# Patient Record
Sex: FEMALE | Race: OTHER | HISPANIC OR LATINO | URBAN - METROPOLITAN AREA
[De-identification: names, ages, dates, MRNs, and addresses within clinical notes are randomized per-mention and may not be internally consistent; named-entity substitution may affect disease eponyms.]

---

## 2021-09-18 ENCOUNTER — EMERGENCY (EMERGENCY)
Facility: HOSPITAL | Age: 81
LOS: 1 days | Discharge: HOME | End: 2021-09-18
Attending: EMERGENCY MEDICINE
Payer: MEDICAID

## 2021-09-18 VITALS
RESPIRATION RATE: 18 BRPM | OXYGEN SATURATION: 96 % | TEMPERATURE: 99 F | DIASTOLIC BLOOD PRESSURE: 83 MMHG | HEART RATE: 77 BPM | SYSTOLIC BLOOD PRESSURE: 185 MMHG

## 2021-09-18 VITALS
HEART RATE: 76 BPM | RESPIRATION RATE: 18 BRPM | SYSTOLIC BLOOD PRESSURE: 164 MMHG | OXYGEN SATURATION: 97 % | DIASTOLIC BLOOD PRESSURE: 67 MMHG | TEMPERATURE: 98 F

## 2021-09-18 LAB
ALBUMIN SERPL ELPH-MCNC: 3.5 G/DL — SIGNIFICANT CHANGE UP (ref 3.5–5)
ALP SERPL-CCNC: 114 U/L — SIGNIFICANT CHANGE UP (ref 40–120)
ALT FLD-CCNC: 20 U/L DA — SIGNIFICANT CHANGE UP (ref 10–60)
ANION GAP SERPL CALC-SCNC: 8 MMOL/L — SIGNIFICANT CHANGE UP (ref 5–17)
APPEARANCE UR: CLEAR — SIGNIFICANT CHANGE UP
AST SERPL-CCNC: 19 U/L — SIGNIFICANT CHANGE UP (ref 10–40)
BASOPHILS # BLD AUTO: 0.04 K/UL — SIGNIFICANT CHANGE UP (ref 0–0.2)
BASOPHILS NFR BLD AUTO: 0.4 % — SIGNIFICANT CHANGE UP (ref 0–2)
BILIRUB SERPL-MCNC: 0.5 MG/DL — SIGNIFICANT CHANGE UP (ref 0.2–1.2)
BILIRUB UR-MCNC: NEGATIVE — SIGNIFICANT CHANGE UP
BUN SERPL-MCNC: 18 MG/DL — SIGNIFICANT CHANGE UP (ref 7–18)
CALCIUM SERPL-MCNC: 8.9 MG/DL — SIGNIFICANT CHANGE UP (ref 8.4–10.5)
CHLORIDE SERPL-SCNC: 106 MMOL/L — SIGNIFICANT CHANGE UP (ref 96–108)
CK SERPL-CCNC: 66 U/L — SIGNIFICANT CHANGE UP (ref 21–215)
CO2 SERPL-SCNC: 27 MMOL/L — SIGNIFICANT CHANGE UP (ref 22–31)
COLOR SPEC: YELLOW — SIGNIFICANT CHANGE UP
CREAT SERPL-MCNC: 0.48 MG/DL — LOW (ref 0.5–1.3)
DIFF PNL FLD: ABNORMAL
EOSINOPHIL # BLD AUTO: 0.1 K/UL — SIGNIFICANT CHANGE UP (ref 0–0.5)
EOSINOPHIL NFR BLD AUTO: 1.1 % — SIGNIFICANT CHANGE UP (ref 0–6)
GLUCOSE SERPL-MCNC: 120 MG/DL — HIGH (ref 70–99)
GLUCOSE UR QL: NEGATIVE — SIGNIFICANT CHANGE UP
HCT VFR BLD CALC: 35.7 % — SIGNIFICANT CHANGE UP (ref 34.5–45)
HGB BLD-MCNC: 11.5 G/DL — SIGNIFICANT CHANGE UP (ref 11.5–15.5)
IMM GRANULOCYTES NFR BLD AUTO: 1.6 % — HIGH (ref 0–1.5)
KETONES UR-MCNC: NEGATIVE — SIGNIFICANT CHANGE UP
LEUKOCYTE ESTERASE UR-ACNC: NEGATIVE — SIGNIFICANT CHANGE UP
LYMPHOCYTES # BLD AUTO: 1.86 K/UL — SIGNIFICANT CHANGE UP (ref 1–3.3)
LYMPHOCYTES # BLD AUTO: 20 % — SIGNIFICANT CHANGE UP (ref 13–44)
MCHC RBC-ENTMCNC: 29.6 PG — SIGNIFICANT CHANGE UP (ref 27–34)
MCHC RBC-ENTMCNC: 32.2 GM/DL — SIGNIFICANT CHANGE UP (ref 32–36)
MCV RBC AUTO: 91.8 FL — SIGNIFICANT CHANGE UP (ref 80–100)
MONOCYTES # BLD AUTO: 0.45 K/UL — SIGNIFICANT CHANGE UP (ref 0–0.9)
MONOCYTES NFR BLD AUTO: 4.8 % — SIGNIFICANT CHANGE UP (ref 2–14)
NEUTROPHILS # BLD AUTO: 6.7 K/UL — SIGNIFICANT CHANGE UP (ref 1.8–7.4)
NEUTROPHILS NFR BLD AUTO: 72.1 % — SIGNIFICANT CHANGE UP (ref 43–77)
NITRITE UR-MCNC: NEGATIVE — SIGNIFICANT CHANGE UP
NRBC # BLD: 0 /100 WBCS — SIGNIFICANT CHANGE UP (ref 0–0)
NT-PROBNP SERPL-SCNC: 88 PG/ML — SIGNIFICANT CHANGE UP (ref 0–450)
PH UR: 7 — SIGNIFICANT CHANGE UP (ref 5–8)
PLATELET # BLD AUTO: 229 K/UL — SIGNIFICANT CHANGE UP (ref 150–400)
POTASSIUM SERPL-MCNC: 3.6 MMOL/L — SIGNIFICANT CHANGE UP (ref 3.5–5.3)
POTASSIUM SERPL-SCNC: 3.6 MMOL/L — SIGNIFICANT CHANGE UP (ref 3.5–5.3)
PROT SERPL-MCNC: 7.7 G/DL — SIGNIFICANT CHANGE UP (ref 6–8.3)
PROT UR-MCNC: NEGATIVE — SIGNIFICANT CHANGE UP
RBC # BLD: 3.89 M/UL — SIGNIFICANT CHANGE UP (ref 3.8–5.2)
RBC # FLD: 12.9 % — SIGNIFICANT CHANGE UP (ref 10.3–14.5)
SARS-COV-2 RNA SPEC QL NAA+PROBE: SIGNIFICANT CHANGE UP
SODIUM SERPL-SCNC: 141 MMOL/L — SIGNIFICANT CHANGE UP (ref 135–145)
SP GR SPEC: 1.01 — SIGNIFICANT CHANGE UP (ref 1.01–1.02)
TROPONIN I SERPL-MCNC: <0.015 NG/ML — SIGNIFICANT CHANGE UP (ref 0–0.04)
UROBILINOGEN FLD QL: NEGATIVE — SIGNIFICANT CHANGE UP
WBC # BLD: 9.3 K/UL — SIGNIFICANT CHANGE UP (ref 3.8–10.5)
WBC # FLD AUTO: 9.3 K/UL — SIGNIFICANT CHANGE UP (ref 3.8–10.5)

## 2021-09-18 PROCEDURE — 70450 CT HEAD/BRAIN W/O DYE: CPT | Mod: 26,MA,77

## 2021-09-18 PROCEDURE — 73080 X-RAY EXAM OF ELBOW: CPT | Mod: 26,LT

## 2021-09-18 PROCEDURE — 82962 GLUCOSE BLOOD TEST: CPT

## 2021-09-18 PROCEDURE — 85025 COMPLETE CBC W/AUTO DIFF WBC: CPT

## 2021-09-18 PROCEDURE — 71045 X-RAY EXAM CHEST 1 VIEW: CPT

## 2021-09-18 PROCEDURE — 80053 COMPREHEN METABOLIC PANEL: CPT

## 2021-09-18 PROCEDURE — 87635 SARS-COV-2 COVID-19 AMP PRB: CPT

## 2021-09-18 PROCEDURE — 82550 ASSAY OF CK (CPK): CPT

## 2021-09-18 PROCEDURE — 93005 ELECTROCARDIOGRAM TRACING: CPT

## 2021-09-18 PROCEDURE — 83880 ASSAY OF NATRIURETIC PEPTIDE: CPT

## 2021-09-18 PROCEDURE — 71045 X-RAY EXAM CHEST 1 VIEW: CPT | Mod: 26

## 2021-09-18 PROCEDURE — 73080 X-RAY EXAM OF ELBOW: CPT

## 2021-09-18 PROCEDURE — 84484 ASSAY OF TROPONIN QUANT: CPT

## 2021-09-18 PROCEDURE — 70450 CT HEAD/BRAIN W/O DYE: CPT | Mod: 26,MA

## 2021-09-18 PROCEDURE — 96374 THER/PROPH/DIAG INJ IV PUSH: CPT

## 2021-09-18 PROCEDURE — 36415 COLL VENOUS BLD VENIPUNCTURE: CPT

## 2021-09-18 PROCEDURE — 70450 CT HEAD/BRAIN W/O DYE: CPT | Mod: MA

## 2021-09-18 PROCEDURE — 99285 EMERGENCY DEPT VISIT HI MDM: CPT

## 2021-09-18 PROCEDURE — 99284 EMERGENCY DEPT VISIT MOD MDM: CPT | Mod: 25

## 2021-09-18 PROCEDURE — 72100 X-RAY EXAM L-S SPINE 2/3 VWS: CPT

## 2021-09-18 PROCEDURE — 70486 CT MAXILLOFACIAL W/O DYE: CPT | Mod: 26,MA

## 2021-09-18 PROCEDURE — 81001 URINALYSIS AUTO W/SCOPE: CPT

## 2021-09-18 PROCEDURE — 72100 X-RAY EXAM L-S SPINE 2/3 VWS: CPT | Mod: 26

## 2021-09-18 PROCEDURE — 70486 CT MAXILLOFACIAL W/O DYE: CPT | Mod: MA

## 2021-09-18 PROCEDURE — 87086 URINE CULTURE/COLONY COUNT: CPT

## 2021-09-18 RX ORDER — ONDANSETRON 8 MG/1
4 TABLET, FILM COATED ORAL ONCE
Refills: 0 | Status: COMPLETED | OUTPATIENT
Start: 2021-09-18 | End: 2021-09-18

## 2021-09-18 RX ORDER — ACETAMINOPHEN 500 MG
650 TABLET ORAL ONCE
Refills: 0 | Status: COMPLETED | OUTPATIENT
Start: 2021-09-18 | End: 2021-09-18

## 2021-09-18 RX ORDER — OXYCODONE AND ACETAMINOPHEN 5; 325 MG/1; MG/1
1 TABLET ORAL ONCE
Refills: 0 | Status: DISCONTINUED | OUTPATIENT
Start: 2021-09-18 | End: 2021-09-18

## 2021-09-18 RX ADMIN — Medication 650 MILLIGRAM(S): at 23:50

## 2021-09-18 RX ADMIN — ONDANSETRON 4 MILLIGRAM(S): 8 TABLET, FILM COATED ORAL at 23:57

## 2021-09-18 RX ADMIN — OXYCODONE AND ACETAMINOPHEN 1 TABLET(S): 5; 325 TABLET ORAL at 20:43

## 2021-09-18 RX ADMIN — OXYCODONE AND ACETAMINOPHEN 1 TABLET(S): 5; 325 TABLET ORAL at 20:10

## 2021-09-18 RX ADMIN — Medication 650 MILLIGRAM(S): at 22:18

## 2021-09-18 NOTE — ED PROVIDER NOTE - CLINICAL SUMMARY MEDICAL DECISION MAKING FREE TEXT BOX
Patient is a 82 y/o female presenting to the ED after a mechanical fall. Will order x-ray, CT scan and reassess.

## 2021-09-18 NOTE — ED PROVIDER NOTE - NSFOLLOWUPINSTRUCTIONS_ED_ALL_ED_FT
[facility]                                                                                                                                                                                                                                                                                                                                                                                                                                                                 Comprender denise riesgo de caídas    Understanding Your Risk for Falls      Cada año, millones de personas tienen lesiones graves debido a caídas. Es importante que comprenda denise riesgo de caerse. Hable con el médico acerca de denise riesgo y qué hacer para reducirlo. Hay medidas que puede darren en denise casa para bajar denise riesgo.    Si llegara a tener dharmesh caída grave, es importante que le informe al médico. Caerse dharmesh vez incrementa denise riesgo de volver a caerse.      ¿Cómo pueden afectarme las caídas?  Las lesiones graves debido a caídas son comunes. [place] incluye lo siguiente:  •Fracturas de huesos, samanta fracturas de cadera.      •Lesiones en la kalpana, samanta lesiones cerebrales traumáticas (LCT).      El miedo a caerse puede también hacer que jalen evite actividades y se quede en la casa. [place] puede hacer que los músculos se debiliten y de hecho incrementar el riesgo de caída.      ¿Qué puede aumentar el riesgo?    Hay varios factores de riesgo que aumentan el riesgo de caídas. Cuantos más factores de riesgo tenga, mayor es denise riesgo de caerse. Las lesiones graves debido a caídas ocurren generalmente en personas de más de 65 años de edad. Los niños y los adultos jóvenes de entre 15 y 29 también tienen un riesgo más alto.  Algunos factores de riesgo frecuentes son los siguientes:  •Debilidad de la parte inferior del cuerpo.      •Falta (deficiencia) de vitamina D.      •Estar generalmente débil o confundido debido a dharmesh enfermedad a karel plazo (crónica).      •Mareos o problemas de equilibrio.      •Disminución de la visión.      •Medicamentos que provocan mareos o somnolencia. Estos pueden incluir medicamentos para la presión arterial, la ansiedad, el insomnio o el edema, así samanta medicamentos analgésicos y relajantes musculares.    Otros factores de riesgo son los siguientes:  •Consumo de alcohol.      •Haberse caído en el pasado.      •Tener depresión.      •Dolor en el pie y calzado inadecuado.      •Hacer un trabajo peligroso.    •Tener alguno de los siguientes problemas en denise hogar:  •Peligros de tropiezo, samanta desorden en el piso o alfombras sueltas.      •Iluminación deficiente.      •Mascotas o desorden.        •Demencia o pérdida de la memoria.        ¿Qué medidas puedo darren para bajar mi riesgo de caídas?                  Actividad física     Mantenga un estado físico adecuado. Julienne ejercicios de fuerza y equilibrio. Considere darren dharmesh clase habitual para aumentar la fuerza y el equilibrio. El yoga y el raffi chi son buenas opciones.    Visión     Hágase revisar los ojos todos los años y julienne que actualicen denise receta oftalmológica según sea necesario.    Dispositivos de ayuda para caminar y calzado     •Use zapatos antideslizantes. No use zapatos con tacones altos.      • No camine por la casa en medias o pantuflas.      •Use un bastón o un andador samanta se lo haya indicado el médico.      Seguridad en el hogar     •Instale barandas seguras a ambos lados de las escaleras.      •Instale barras para sostén en la bañera, la ducha y el inodoro. Use dharmesh alfombra antideslizante en la bañera o ducha.      •Use dharmesh buena iluminación en todos los ambientes. Deje dharmesh linterna al lado de la cama.      •Asegúrese de que haya un laura radha de cosas desde la cama hasta el baño. Use luces nocturnas.      • No use tapetes. Asegúrese de que todas las alfombras estén pegadas o adheridas al piso de forma mitchell.      •Elimine todo el desorden de los pasillos y escaleras, incluidos los alargadores eléctricos.      •Repare los peldaños desiguales o rotos.      •Evite caminar en superficies con hielo o resbaladizas. Camine por el pasto en lugar de las aceras con hielo o resbalosas. Cuando pueda, use un derretidor de hielo para deshacerse del hielo de las aceras.      •Use un teléfono inalámbrico.        Preguntas para hacerle al médico    •¿Puede ayudarme a controlar mi riesgo de caídas?      •¿Alguno de mis medicamentos me hace más propenso a las caídas?      •¿Debería darren un suplemento de vitamina D?      •¿Qué ejercicios puedo hacer para mejorar mi fuerza y equilibrio?      •¿Debería hacerme revisar la visión?      •¿Necesito dharmesh densitometría ósea para determinar si tengo los huesos débiles u osteoporosis?      •¿Ayudaría si usara un bastón o andador?        Dónde buscar más información    •Centers for Disease Control and Prevention (Centros para el Control y la Prevención de Enfermedades), STEADI: www.cdc.gov      •Community-Based Fall Prevention Programs (Programas Comunitarios de Prevención de Caídas): www.cdc.gov      •National [place] on Aging (Instituto Nacional sobre el Envejecimiento): vp3oaks.lissette.nih.gov        Comuníquese con un médico si:    •Se  en denise casa.      •Tiene miedo de caerse en denise casa.      •Se siente débil, somnoliento o mareado.        Resumen    •Las personas a partir de los 65 años tienen alto riesgo de caídas. Sin embargo, las personas mayores no son las únicas que se lesionan debido a caídas. Los niños y los adultos jóvenes también tienen un riesgo mayor al normal.      •Hable con el médico acerca de denise riesgo de caerse y cómo reducir oralia riesgo.      •Darren ciertas precauciones en la casa pude reducir denise riesgo de caerse.      •Si se , informe siempre a denise médico.      Esta información no tiene samanta fin reemplazar el consejo del médico. Asegúrese de hacerle al médico cualquier pregunta que tenga.      Document Revised: 2020 Document Reviewed: 2020    [employer] Patient Education ©  [employer]                        Log Out.      [employer]® CareNotes®     :  [facility]  	                       HEAD INJURY - General Information           Traumatismo craneal    LO QUE NECESITA SABER:    ¿Qué necesito saber sobre un traumatismo craneal?Un traumatismo craneal puede incluir el cuero cabelludo, la bandar, el cráneo o el cerebro y puede variar de leve a grave. Los efectos pueden aparecer inmediatamente después de la lesión o desarrollarse más tarde. Los efectos pueden durar poco tiempo o ser permanentes. Es posible que los médicos quieran revisar denise recuperación con el tiempo. El tratamiento puede cambiar a medida que usted se recupera o desarrolla nuevos problemas de edson por el traumatismo craneal.    ¿Cuáles son los signos y síntomas de un traumatismo craneal?  •Dharmesh herida abierta en el cuero cabelludo o en la piel, hinchazón o moretones      •Dolor de kalpana leve a moderado      •Mareos o pérdida del equilibrio      •Náuseas o vómitos      •Zumbido en los oídos o dolor de victor manuel      •Confusión, especialmente después de la lesión.      •Cambios en el estado de ánimo, samanta sentirse inquieto o irritable      •Dificultad para pensar, recordar las cosas o concentrarse      •Somnolencia o disminución de la energía      •Dificultad para dormir      ¿Cómo se diagnostica un traumatismo craneal?  •Informe a denise médico acerca de la lesión y eugenia síntomas. El médico le hará un examen para comprobar denise función cerebral. Revisará la reacción de eugenia pupilas a la monica. También revisará denise memoria, la firmeza de eugenia daniel y denise equilibrio.      •Puede necesitar radiografías, dharmesh TC o dharmesh IRM para detectar sangrado o daños importantes en el cráneo o el cerebro. Es posible que le administren un líquido de contraste para que las imágenes se aprecien mejor. Dígale al médico si usted alguna vez ha tenido dharmesh reacción alérgica al líquido de contraste. No entre a la racheal donde se realiza la resonancia magnética con algo de metal. El metal puede causar lesiones serias. Informe a denise médico si usted tiene algún metal dentro o sobre denise cuerpo.      ¿Cómo se trata un traumatismo craneal?Es posible que un traumatismo craneal leve no necesite tratamiento. Es posible que le den medicación para disminuir el dolor. Otros tratamientos pueden depender de la gravedad del traumatismo craneal. Dharmesh conmoción cerebral, un hematoma (acumulación de marina) o un traumatismo craneoencefálico pueden requerir tratamiento inmediato y a karel plazo.    ¿Cómo puedo controlar los síntomas?  •Descanseo julienne actividades tranquilas. Limite denise tiempo viendo la televisión, utilizando la computadora o realizando tareas que requieren mucho pensamiento. Regrese lentamente eugenia actividades acostumbradas samanta le indiquen. No practique deportes ni julienne actividades que puedan provocarle un golpe en la kalpana. Pregunte a denise médico cuándo puede regresar al deporte.      •Aplique hieloen la kalpana de 15 a 20 minutos cada hora o samanta se le indique. Use dharmesh compresa de hielo o ponga hielo triturado en dharmesh bolsa de plástico. Cúbralo con dharmesh toalla antes de aplicarlo sobre denise piel. El hielo ayuda a evitar daño al tejido y a disminuir la inflamación y el dolor.      •Solicite que alguien se quede con usted por 24 horas, o samanta se le indique. Esta persona puede monitorearlo para detectar problemas y pedir ayuda si es necesario. Cuando se despierte, linda persona debe hacerle algunas preguntas cada pocas horas para dionisio si usted está pensando con claridad. Un ejemplo es preguntarle denise nombre o denise dirección.      ¿Qué puedo hacer para prevenir otro traumatismo craneal?  •Use un sara que se ajuste correctamente.Julienne esto cuando practica deportes, o vinay dharmesh bicicleta, scooter o patineta. Los cascos ayudan a disminuir el riesgo de un traumatismo craneal grave. Hable con denise médico acerca de otras maneras en las que usted puede protegerse si practica deportes.      •Use el cinturón de seguridad cada vez que esté en un automóvil.[place] ayuda a disminuir el riesgo de sufrir un traumatismo craneal si tiene un accidente.      Llame al número local de emergencias (911 en [place]), o pídale a alguien que llame si:  •No es posible despertarlo.      •Usted sufre dharmesh convulsión.      •Usted rell de reaccionar cuando le hablan o se desmaya.      •Usted tiene visión borrosa o doble.      •Usted arrastra las palabras o se confunde al hablar.      •Usted tiene debilidad en denise brazo o pierna, pierde la sensación o presenta nuevos problemas de coordinación.      •[place] pupilas son más grandes de lo habitual o dharmesh pupila es de tamaño diferente de la otra.      •A usted le sale marina o un líquido gus de los oídos o la nariz.      ¿Cuándo royer buscar atención inmediata?  •Usted tiene vómitos reiterados o cameron.      •Se siente confundido.      •El dolor de kalpana empeora o se vuelve intenso.      •Usted o dharmesh persona que lo cuida nota que le brody más despertarse que de costumbre.      ¿Cuándo royer llamar a mi médico?  •[place] síntomas ricardo más de 6 semanas después de la lesión.      •Usted tiene preguntas o inquietudes acerca de denise condición o cuidado.      ACUERDOS SOBRE DENISE CUIDADO:    Usted tiene el derecho de ayudar a planear denise cuidado. Aprenda todo lo que pueda sobre denise condición y samanta darle tratamiento. Discuta eugenia opciones de tratamiento con eugenia médicos para decidir el cuidado que usted desea recibir. Usted siempre tiene el derecho de rechazar el tratamiento.       © Copyright [employer]            back to top                          © Copyright [employer]  Comprender denise riesgo de caídas    Understanding Your Risk for Falls      Cada año, millones de personas tienen lesiones graves debido a caídas. Es importante que comprenda denise riesgo de caerse. Hable con el médico acerca de denise riesgo y qué hacer para reducirlo. Hay medidas que puede darren en denise casa para bajar denise riesgo.    Si llegara a tener dharmesh caída grave, es importante que le informe al médico. Caerse dharmesh vez incrementa denise riesgo de volver a caerse.      ¿Cómo pueden afectarme las caídas?  Las lesiones graves debido a caídas son comunes. [place] incluye lo siguiente:  •Fracturas de huesos, samanta fracturas de cadera.      •Lesiones en la kalpana, samanta lesiones cerebrales traumáticas (LCT).      El miedo a caerse puede también hacer que jalen evite actividades y se quede en la casa. [place] puede hacer que los músculos se debiliten y de hecho incrementar el riesgo de caída.      ¿Qué puede aumentar el riesgo?    Hay varios factores de riesgo que aumentan el riesgo de caídas. Cuantos más factores de riesgo tenga, mayor es denise riesgo de caerse. Las lesiones graves debido a caídas ocurren generalmente en personas de más de 65 años de edad. Los niños y los adultos jóvenes de entre 15 y 29 también tienen un riesgo más alto.  Algunos factores de riesgo frecuentes son los siguientes:  •Debilidad de la parte inferior del cuerpo.      •Falta (deficiencia) de vitamina D.      •Estar generalmente débil o confundido debido a dharmesh enfermedad a karel plazo (crónica).      •Mareos o problemas de equilibrio.      •Disminución de la visión.      •Medicamentos que provocan mareos o somnolencia. Estos pueden incluir medicamentos para la presión arterial, la ansiedad, el insomnio o el edema, así samanta medicamentos analgésicos y relajantes musculares.    Otros factores de riesgo son los siguientes:  •Consumo de alcohol.      •Haberse caído en el pasado.      •Tener depresión.      •Dolor en el pie y calzado inadecuado.      •Hacer un trabajo peligroso.    •Tener alguno de los siguientes problemas en denise hogar:  •Peligros de tropiezo, samanta desorden en el piso o alfombras sueltas.      •Iluminación deficiente.      •Mascotas o desorden.        •Demencia o pérdida de la memoria.        ¿Qué medidas puedo darren para bajar mi riesgo de caídas?                  Actividad física     Mantenga un estado físico adecuado. [place] ejercicios de fuerza y equilibrio. Considere darren dharmesh clase habitual para aumentar la fuerza y el equilibrio. El yoga y el raffi chi son buenas opciones.    Visión     Hágase revisar los ojos todos los años y julienne que actualicen denise receta oftalmológica según sea necesario.    Dispositivos de ayuda para caminar y calzado     •Use zapatos antideslizantes. No use zapatos con tacones altos.      • No camine por la casa en medias o pantuflas.      •Use un bastón o un andador samanta se lo haya indicado el médico.      Seguridad en el hogar     •Instale barandas seguras a ambos lados de las escaleras.      •Instale barras para sostén en la bañera, la ducha y el inodoro. Use dharmesh alfombra antideslizante en la bañera o ducha.      •Use dharmesh buena iluminación en todos los ambientes. Deje dharmesh linterna al lado de la cama.      •Asegúrese de que haya un laura radha de cosas desde la cama hasta el baño. Use luces nocturnas.      • No use tapetes. Asegúrese de que todas las alfombras estén pegadas o adheridas al piso de forma mitchell.      •Elimine todo el desorden de los pasillos y escaleras, incluidos los alargadores eléctricos.      •Repare los peldaños desiguales o rotos.      •Evite caminar en superficies con hielo o resbaladizas. Camine por el pasto en lugar de las aceras con hielo o resbalosas. Cuando pueda, use un derretidor de hielo para deshacerse del hielo de las aceras.      •Use un teléfono inalámbrico.        Preguntas para hacerle al médico    •¿Puede ayudarme a controlar mi riesgo de caídas?      •¿Alguno de mis medicamentos me hace más propenso a las caídas?      •¿Debería darren un suplemento de vitamina D?      •¿Qué ejercicios puedo hacer para mejorar mi fuerza y equilibrio?      •¿Debería hacerme revisar la visión?      •¿Necesito dharmesh densitometría ósea para determinar si tengo los huesos débiles u osteoporosis?      •¿Ayudaría si usara un bastón o andador?        Dónde buscar más información    •Centers for Disease Control and Prevention (Centros para el Control y la Prevención de Enfermedades), STEADI: www.cdc.gov      •Community-Based Fall Prevention Programs (Programas Comunitarios de Prevención de Caídas): www.cdc.gov      •National [place] on Aging (Instituto Nacional sobre el Envejecimiento): na6xfnv.lissette.nih.gov        Comuníquese con un médico si:    •Se  en denise casa.      •Tiene miedo de caerse en denise casa.      •Se siente débil, somnoliento o mareado.        Resumen    •Las personas a partir de los 65 años tienen alto riesgo de caídas. Sin embargo, las personas mayores no son las únicas que se lesionan debido a caídas. Los niños y los adultos jóvenes también tienen un riesgo mayor al normal.      •Hable con el médico acerca de denise riesgo de caerse y cómo reducir oralia riesgo.      •Darren ciertas precauciones en la casa pude reducir denise riesgo de caerse.      •Si se , informe siempre a denise médico.      Esta información no tiene samanta fin reemplazar el consejo del médico. Asegúrese de hacerle al médico cualquier pregunta que tenga.      Document Revised: 2020 Document Reviewed: 2020    [employer] Patient Education 2021 Elsevier Inc.                        Log Out.      [employer]® CareNotes®     :  [facility]  	                       HEAD INJURY - General Information           Traumatismo craneal    LO QUE NECESITA SABER:    ¿Qué necesito saber sobre un traumatismo craneal?Un traumatismo craneal puede incluir el cuero cabelludo, la bandar, el cráneo o el cerebro y puede variar de leve a grave. Los efectos pueden aparecer inmediatamente después de la lesión o desarrollarse más tarde. Los efectos pueden durar poco tiempo o ser permanentes. Es posible que los médicos quieran revisar denise recuperación con el tiempo. El tratamiento puede cambiar a medida que usted se recupera o desarrolla nuevos problemas de edson por el traumatismo craneal.    ¿Cuáles son los signos y síntomas de un traumatismo craneal?  •Dharmesh herida abierta en el cuero cabelludo o en la piel, hinchazón o moretones      •Dolor de kalpana leve a moderado      •Mareos o pérdida del equilibrio      •Náuseas o vómitos      •Zumbido en los oídos o dolor de victor manuel      •Confusión, especialmente después de la lesión.      •Cambios en el estado de ánimo, samanta sentirse inquieto o irritable      •Dificultad para pensar, recordar las cosas o concentrarse      •Somnolencia o disminución de la energía      •Dificultad para dormir      ¿Cómo se diagnostica un traumatismo craneal?  •Informe a denise médico acerca de la lesión y eugenia síntomas. El médico le hará un examen para comprobar denise función cerebral. Revisará la reacción de eugenia pupilas a la monica. También revisará denise memoria, la firmeza de eugenia daniel y denise equilibrio.      •Puede necesitar radiografías, dharmesh TC o dharmesh IRM para detectar sangrado o daños importantes en el cráneo o el cerebro. Es posible que le administren un líquido de contraste para que las imágenes se aprecien mejor. Dígale al médico si usted alguna vez ha tenido dharmesh reacción alérgica al líquido de contraste. No entre a la racheal donde se realiza la resonancia magnética con algo de metal. El metal puede causar lesiones serias. Informe a denise médico si usted tiene algún metal dentro o sobre denise cuerpo.      ¿Cómo se trata un traumatismo craneal?Es posible que un traumatismo craneal leve no necesite tratamiento. Es posible que le den medicación para disminuir el dolor. Otros tratamientos pueden depender de la gravedad del traumatismo craneal. Dharmesh conmoción cerebral, un hematoma (acumulación de marina) o un traumatismo craneoencefálico pueden requerir tratamiento inmediato y a karel plazo.    ¿Cómo puedo controlar los síntomas?  •Descanseo julienne actividades tranquilas. Limite denise tiempo viendo la televisión, utilizando la computadora o realizando tareas que requieren mucho pensamiento. Regrese lentamente eugenia actividades acostumbradas samanta le indiquen. No practique deportes ni julienne actividades que puedan provocarle un golpe en la kalpana. Pregunte a denise médico cuándo puede regresar al deporte.      •Aplique hieloen la kalpana de 15 a 20 minutos cada hora o samanta se le indique. Use dharmesh compresa de hielo o ponga hielo triturado en dharmesh bolsa de plástico. Cúbralo con dharmesh toalla antes de aplicarlo sobre denise piel. El hielo ayuda a evitar daño al tejido y a disminuir la inflamación y el dolor.      •Solicite que alguien se quede con usted por 24 horas, o samanta se le indique. Esta persona puede monitorearlo para detectar problemas y pedir ayuda si es necesario. Cuando se despierte, linda persona debe hacerle algunas preguntas cada pocas horas para dionisio si usted está pensando con claridad. Un ejemplo es preguntarle denise nombre o denise dirección.      ¿Qué puedo hacer para prevenir otro traumatismo craneal?  •Use un sara que se ajuste correctamente.Julienne esto cuando practica deportes, o vinay dharmesh bicicleta, scooter o patineta. Los cascos ayudan a disminuir el riesgo de un traumatismo craneal grave. Hable con denise médico acerca de otras maneras en las que usted puede protegerse si practica deportes.      •Use el cinturón de seguridad cada vez que esté en un automóvil.[place] ayuda a disminuir el riesgo de sufrir un traumatismo craneal si tiene un accidente.      Llame al número local de emergencias (911 en [place]), o pídale a alguien que llame si:  •No es posible despertarlo.      •Usted sufre dharmesh convulsión.      •Usted rell de reaccionar cuando le hablan o se desmaya.      •Usted tiene visión borrosa o doble.      •Usted arrastra las palabras o se confunde al hablar.      •Usted tiene debilidad en denise brazo o pierna, pierde la sensación o presenta nuevos problemas de coordinación.      •[place]ug[place] pupilas son más grandes de lo habitual o dharmesh pupila es de tamaño diferente de la otra.      •A usted le sale marina o un líquido gus de los oídos o la nariz.      ¿Cuándo royer buscar atención inmediata?  •Usted tiene vómitos reiterados o cameron.      •Se siente confundido.      •El dolor de kalpana empeora o se vuelve intenso.      •Usted o dharmesh persona que lo cuida nota que le brody más despertarse que de costumbre.      ¿Cuándo royer llamar a mi médico?  •[place]ug[place] síntomas ricardo más de 6 semanas después de la lesión.      •Usted tiene preguntas o inquietudes acerca de denise condición o cuidado.      ACUERDOS SOBRE DENISE CUIDADO:    Usted tiene el derecho de ayudar a planear denise cuidado. Aprenda todo lo que pueda sobre denise condición y samanta darle tratamiento. Discuta eugenia opciones de tratamiento con eugenia médicos para decidir el cuidado que usted desea recibir. Usted siempre tiene el derecho de rechazar el tratamiento.       © Copyright [employer]            back to top                          © Copyright [employer]

## 2021-09-18 NOTE — ED ADULT NURSE NOTE - OBJECTIVE STATEMENT
As per daughter Malcolm Mahan pt tripped and fell hitting face c/o lower back pain on arrival pt was vomiting denies LOC

## 2021-09-18 NOTE — ED PROVIDER NOTE - SKIN, MLM
dry, intact, no bleeding and no hematoma.  Skin normal color for race, warm, dry and intact. No evidence of rash.

## 2021-09-18 NOTE — ED PROVIDER NOTE - OBJECTIVE STATEMENT
Patient is a 80 y/o female with no significant PMHx or PSHx presents to the ED after a mechanical fall today. Patient is c/o headache and back pain and think [place] the [place]. Patient denies all other acute complaints. NKDA.

## 2021-09-18 NOTE — ED PROVIDER NOTE - MUSCULOSKELETAL, MLM
Vague lumbar back tenderness to palpation, no rib tenderness to palpation, full range of motion of both hips

## 2021-09-18 NOTE — ED PROVIDER NOTE - PATIENT PORTAL LINK FT
You can access the FollowMyHealth Patient Portal offered by [facility] by registering at the following website: [email or web address]. By joining [employer]’s FollowMyHealth portal, you will also be able to view your health information using other applications (apps) compatible with our system. You can access the FollowMyHealth Patient Portal offered by [facility] by registering at the following website: [email or web address]. By joining [employer]’s FollowMyHealth portal, you will also be able to view your health information using other applications (apps) compatible with our system.

## 2021-09-18 NOTE — ED PROVIDER NOTE - CROS ED RESP ALL NEG
negative... Odomzo Counseling- I discussed with the patient the risks of Odomzo including but not limited to nausea, vomiting, diarrhea, constipation, weight loss, changes in the sense of taste, decreased appetite, muscle spasms, and hair loss.  The patient verbalized understanding of the proper use and possible adverse effects of Odomzo.  All of the patient's questions and concerns were addressed.

## 2021-09-18 NOTE — ED PROVIDER NOTE - PROGRESS NOTE DETAILS
ambulatory with steady gait in Emergency Department. awake alert, pending u/a and elbow xray (noted left elbow bruise and swelling on reassessment) then likely discharge Martinez:  Pt received in signout from Dr. Lira, pending repeat CT head due to vomiting, and also X-ray of elbow.  X-ray of elbow shows post-surgical changes with hardware appearing in place, but no acute fracture/dislocation.  Repeat CT head WNL and no longer vomiting.  Advised strict return precautions and PMD f/u.  Will be brought home by daughter. Martinez:  Following discharge, Pt was about to leave the ED and then started vomiting again.  IV replaced and Zofran given.  Will observe and reassess. Martinez:  Pt received Zofran and observed, now improved and no further vomiting.  Advised strict return precautions and PMD f/u.

## 2021-09-19 LAB
CULTURE RESULTS: SIGNIFICANT CHANGE UP
SPECIMEN SOURCE: SIGNIFICANT CHANGE UP

## 2021-09-20 ENCOUNTER — EMERGENCY (EMERGENCY)
Facility: HOSPITAL | Age: 81
LOS: 1 days | Discharge: HOME | End: 2021-09-20
Attending: EMERGENCY MEDICINE
Payer: MEDICAID

## 2021-09-20 VITALS
WEIGHT: 97.44 LBS | TEMPERATURE: 98 F | HEIGHT: 55 IN | HEART RATE: 96 BPM | SYSTOLIC BLOOD PRESSURE: 178 MMHG | RESPIRATION RATE: 17 BRPM | OXYGEN SATURATION: 96 % | DIASTOLIC BLOOD PRESSURE: 77 MMHG

## 2021-09-20 PROCEDURE — 71045 X-RAY EXAM CHEST 1 VIEW: CPT | Mod: 26

## 2021-09-20 PROCEDURE — 99285 EMERGENCY DEPT VISIT HI MDM: CPT

## 2021-09-20 RX ORDER — ACETAMINOPHEN 500 MG
650 TABLET ORAL ONCE
Refills: 0 | Status: COMPLETED | OUTPATIENT
Start: 2021-09-20 | End: 2021-09-20

## 2021-09-20 RX ADMIN — Medication 650 MILLIGRAM(S): at 23:39

## 2021-09-20 NOTE — ED PROVIDER NOTE - OBJECTIVE STATEMENT
82 y/o female h/o HTN, presents with persistent headache since fall 2 days ago. Reports at that time she had a CT which was negative and was then sent home. Has been taking Tylenol and Motrin around the clock with no relief of headache. Daughter also notes BP was high to 180 systolic, thus brought pt to the ED for evaluation. Denies vomiting at home. Zachery any confusion, fever, or cough. 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 Evaluation was performed in the Estonian language by Dr. Blair.

## 2021-09-20 NOTE — ED PROVIDER NOTE - NSFOLLOWUPCLINICS_GEN_ALL_ED_FT
[facility]  Neurology  [address] [place] [address]  Phone: [phone]  Fax: [phone]

## 2021-09-20 NOTE — ED PROVIDER NOTE - ENMT, MLM
Mild swelling over the nasal bridge. Airway patent, Nasal mucosa clear. Mouth with normal mucosa. Throat has no vesicles, no oropharyngeal exudates and uvula is midline.

## 2021-09-20 NOTE — ED PROVIDER NOTE - NSFOLLOWUPINSTRUCTIONS_ED_ALL_ED_FT
continue tylenol 650mg every 6 hours as needed for headache.  If headaches continue followup with neurology specialist above.  Followup with PMD for reevaluation of elevated blood pressure.  Return to ED if your symptoms worsen.    continúe con tylenol 650 mg cada 6 horas según sea necesario para el dolor de kalpana.  Si tiene vincent de kalpana, continúe con el seguimiento con el especialista en neurología mencionado anteriormente.  Seguimiento con PMD para la reevaluación de la presión arterial elevada.  Regrese al servicio de urgencias si robin síntomas empeoran.        Dolor de kalpana crónico postraumático    LO QUE NECESITA SABER:    Un dolor de kalpana crónico postraumático se desarrolla días a semanas de veronique sufrido dharmesh lesión en la kalpana y dura hasta más de 3 meses. Éste dolor también puede ser un síntoma de dharmesh condición más grave llamada síndrome postconcusión cerebral. El síndrome posconmocional es un rambo de síntomas que afectan los nervios, el pensamiento y el comportamiento.    INSTRUCCIONES SOBRE EL [facility]IS HOSPITALARIA:    Llame al 911 o pídale a alguien más que llame en cualquiera de los siguientes casos:  •No es posible despertarlo.      •Usted sufre dharmesh convulsión.      Regrese a la racheal de emergencias si:  •Usted tiene dolor de kalpana repentino que parece diferente o peor que robin vincent de kalpana habituales.      •Usted tiene un cambio repentino en denise visión.      Comuníquese con denise médico si:  •Usted esta teniendo vincent de kalpana con más frecuencia o el dolor es más severo.      •Usted tiene dolor que comienza cuando se esfuerza o cambia de posición.      •Usted tiene vincent de kalpana que lo despiertan en la noche.      •Usted tiene dolor que no se jovanny con medicación.      •Usted siempre tiene dolor en el mismo lado de la kalpana.      •Usted tiene preguntas o inquietudes acerca de denise condición o cuidado.      Medicamentos:El dolor de kalpana es más fácil de controlar si usted immanuel denise medicamento para el dolor tan pronto comienza a sentir el dolor. Usted va a necesitar limitar los medicamentos para el dolor para prevenir dharmesh condición llamada vincent de kalpana de rebote. Denise médico le va a indicar cuando y con que frecuencia usted necesita darren el medicamento para el dolor. Es posible que usted necesite alguno de los siguientes:   •Se podrían administrar medicamentos para el dolor con receta médicase pueden administrar para controlar o prevenir un dolor de kalpana. Denise médico le va a indicar cuales medicamentos podrían funcionar mejor para usted.      •Los FOSTER,samanta el ibuprofeno, ayudan a disminuir la inflamación, el dolor y la fiebre. Sania medicamento está disponible con o sin dharmesh receta médica. Los FOSTER pueden causar sangrado estomacal o problemas renales en ciertas personas. Si usted immanuel un medicamento anticoagulante, siempre pregúntele a denise médico si los FOSTER son seguros para usted. Siempre hernan la etiqueta de sania medicamento y siga las instrucciones.      •Acetaminofénalivia el dolor. El acetaminofeno puede obtener sin receta médica. Pregunte la cantidad y la frecuencia con que debe tomarlos. Siga las indicaciones. El acetaminofén puede causar daño en el hígado cuando no se immanuel de forma correcta.      •MedicamentosSe podrían administrar para controlar náuseas o vómitos.      •[place] robin medicamentos samanta se le haya indicado.Consulte con denise médico si usted quynh que denise medicamento no le está ayudando o si presenta efectos secundarios. Infórmele si es alérgico a cualquier medicamento. Mantenga dharmesh lista actualizada de los medicamentos, las vitaminas y los productos herbales que immanuel. Incluya los siguientes datos de los medicamentos: cantidad, frecuencia y motivo de administración. Traiga con usted la lista o los envases de las píldoras a robin citas de seguimiento. Lleve la lista de los medicamentos con usted en shaji de dharmesh emergencia.      El manejo de robin síntomas:  •Consuma alimentos saludables y variados.Los alimentos saludables incluyen frutas, verduras, pan integral, productos lácteos bajos en grasa, frijoles, jing magras y pescado. No coma alimentos que le provoquen vincent de kalpana.  Alimentos saludables           •Realice actividad física con regularidad.El ejercicio ayuda a aliviar la tensión y los vincent de kalpana. Pida más información acerca de un plan de ejercicio adecuado para usted.  Caminar para ejercitarse           •No consuma alcohol.El alcohol puede desencadenar un dolor de kalpana. También puede evitar que los medicamentos le quiten el dolor de kalpana.      •No fume.La nicotina y otros químicos en los cigarrillos o cigarros pueden desencadenar un dolor de kalpana y también pueden provocar daño al pulmón. Pida información a denise médico si usted actualmente fuma y necesita ayuda para dejar de fumar. Los cigarrillos electrónicos o el tabaco sin humo igualmente contienen nicotina. Consulte con denise médico antes de utilizar estos productos.      •[place] líquidos samanta se le haya indicado.Es probable que usted necesite darren más líquido para prevenir la deshidratación. La deshidratación puede causar vincent de kalpana. Pregunte a denise médico sobre la cantidad de líquido que necesita darren todos los dl y cuáles le recomienda.      •Establezca un horario y dharmesh rutina para ir a dormir.La falta de sueño puede desencadenar vincent de kalpana o que empeoren. Acuéstese y levántese a la misma hora todos los días. Consulte con denise médico si usted tiene dificultad para dormir.      •Mantenga un registro de robin vincent de kalpana.Incluya cuándo comienzan y cesan, y qué los mejoran. Describa robin síntomas, cómo se siente el dolor, dónde es, y denise severidad. Registre todo lo que comió o tomó ginny las 24 horas antes de que comenzara el dolor de kalpana. Traiga denise registro a robin citas.      •Aplique calor o hielo según lo indicado.El calor y hielo ayuda a reducir el dolor de kalpana y el hielo también puede aliviar los espasmos musculares. Cubra la bolsa de calor o hielo con dharmesh toalla antes de aplicarla sobre la piel. Aplíquese calor en el área lesionada ginny 20 a 30 minutos cada 2 horas ginny la cantidad de días que le indiquen. Aplique hielo de 15 a 20 minutos cada hora samanta se le haya indicado. Es probable que denise médico le recomiende que alterne entre calor y hielo.      Para apoyo y más información:  •[employer]  [address], [place]  Phone: [phone]  Phone: [phone]  Web Address: http://www.[facility].org        Acuda a robin consultas de control con denise médico según le indicaron.Traiga denise registro de vincent de kalpana con usted cuando visite a denise médico. Anote robin preguntas para que se acuerde de hacerlas ginny robin visitas.

## 2021-09-20 NOTE — ED PROVIDER NOTE - CLINICAL SUMMARY MEDICAL DECISION MAKING FREE TEXT BOX
80 y/o female h/o HTN, presents with persistent headache. Will obtain EKG, labs, and CT given. Given Tylenol for headache. Will reassess. 80 y/o female h/o HTN, presents with persistent headache. Will obtain EKG, labs, and CT given. Given Tylenol for headache. Will reassess.    ekg nonischemic, trop neg  CT head shows No acute intracranial hemorrhage or mass effect. No interval change compared to prior exam from 9/18/2021.  CT cervical spine shows No acute intracranial hemorrhage or mass effect. No interval change compared to prior exam from 9/18/2021. 82 y/o female h/o HTN, presents with persistent headache. Will obtain EKG, labs, and CT given. Given Tylenol for headache. Will reassess.    ekg nonischemic, trop neg  CT head shows No acute intracranial hemorrhage or mass effect. No interval change compared to prior exam from 9/18/2021.  CT cervical spine shows No acute intracranial hemorrhage or mass effect. No interval change compared to prior exam from 9/18/2021.  Discussed above with patient and family at bedside. On reeval patient reports resolution of headache. patient stable for discharge.

## 2021-09-20 NOTE — ED PROVIDER NOTE - PATIENT PORTAL LINK FT
You can access the FollowMyHealth Patient Portal offered by [facility] by registering at the following website: [email or web address]. By joining [employer]s FollowMyHealth portal, you will also be able to view your health information using other applications (apps) compatible with our system.

## 2021-09-21 VITALS
OXYGEN SATURATION: 97 % | TEMPERATURE: 98 F | HEART RATE: 63 BPM | SYSTOLIC BLOOD PRESSURE: 173 MMHG | RESPIRATION RATE: 18 BRPM | DIASTOLIC BLOOD PRESSURE: 72 MMHG

## 2021-09-21 LAB
ALBUMIN SERPL ELPH-MCNC: 3.2 G/DL — LOW (ref 3.5–5)
ALP SERPL-CCNC: 96 U/L — SIGNIFICANT CHANGE UP (ref 40–120)
ALT FLD-CCNC: 15 U/L DA — SIGNIFICANT CHANGE UP (ref 10–60)
ANION GAP SERPL CALC-SCNC: 9 MMOL/L — SIGNIFICANT CHANGE UP (ref 5–17)
APPEARANCE UR: CLEAR — SIGNIFICANT CHANGE UP
AST SERPL-CCNC: 10 U/L — SIGNIFICANT CHANGE UP (ref 10–40)
BASOPHILS # BLD AUTO: 0.03 K/UL — SIGNIFICANT CHANGE UP (ref 0–0.2)
BASOPHILS NFR BLD AUTO: 0.5 % — SIGNIFICANT CHANGE UP (ref 0–2)
BILIRUB SERPL-MCNC: 0.4 MG/DL — SIGNIFICANT CHANGE UP (ref 0.2–1.2)
BILIRUB UR-MCNC: NEGATIVE — SIGNIFICANT CHANGE UP
BUN SERPL-MCNC: 21 MG/DL — HIGH (ref 7–18)
CALCIUM SERPL-MCNC: 8.7 MG/DL — SIGNIFICANT CHANGE UP (ref 8.4–10.5)
CHLORIDE SERPL-SCNC: 107 MMOL/L — SIGNIFICANT CHANGE UP (ref 96–108)
CO2 SERPL-SCNC: 26 MMOL/L — SIGNIFICANT CHANGE UP (ref 22–31)
COLOR SPEC: YELLOW — SIGNIFICANT CHANGE UP
CREAT SERPL-MCNC: 0.44 MG/DL — LOW (ref 0.5–1.3)
DIFF PNL FLD: ABNORMAL
EOSINOPHIL # BLD AUTO: 0.14 K/UL — SIGNIFICANT CHANGE UP (ref 0–0.5)
EOSINOPHIL NFR BLD AUTO: 2.1 % — SIGNIFICANT CHANGE UP (ref 0–6)
GLUCOSE SERPL-MCNC: 103 MG/DL — HIGH (ref 70–99)
GLUCOSE UR QL: NEGATIVE — SIGNIFICANT CHANGE UP
HCT VFR BLD CALC: 33.9 % — LOW (ref 34.5–45)
HGB BLD-MCNC: 11.1 G/DL — LOW (ref 11.5–15.5)
IMM GRANULOCYTES NFR BLD AUTO: 0.3 % — SIGNIFICANT CHANGE UP (ref 0–1.5)
KETONES UR-MCNC: NEGATIVE — SIGNIFICANT CHANGE UP
LEUKOCYTE ESTERASE UR-ACNC: ABNORMAL
LYMPHOCYTES # BLD AUTO: 2.11 K/UL — SIGNIFICANT CHANGE UP (ref 1–3.3)
LYMPHOCYTES # BLD AUTO: 31.9 % — SIGNIFICANT CHANGE UP (ref 13–44)
MAGNESIUM SERPL-MCNC: 2.3 MG/DL — SIGNIFICANT CHANGE UP (ref 1.6–2.6)
MCHC RBC-ENTMCNC: 29.9 PG — SIGNIFICANT CHANGE UP (ref 27–34)
MCHC RBC-ENTMCNC: 32.7 GM/DL — SIGNIFICANT CHANGE UP (ref 32–36)
MCV RBC AUTO: 91.4 FL — SIGNIFICANT CHANGE UP (ref 80–100)
MONOCYTES # BLD AUTO: 0.68 K/UL — SIGNIFICANT CHANGE UP (ref 0–0.9)
MONOCYTES NFR BLD AUTO: 10.3 % — SIGNIFICANT CHANGE UP (ref 2–14)
NEUTROPHILS # BLD AUTO: 3.64 K/UL — SIGNIFICANT CHANGE UP (ref 1.8–7.4)
NEUTROPHILS NFR BLD AUTO: 54.9 % — SIGNIFICANT CHANGE UP (ref 43–77)
NITRITE UR-MCNC: NEGATIVE — SIGNIFICANT CHANGE UP
NRBC # BLD: 0 /100 WBCS — SIGNIFICANT CHANGE UP (ref 0–0)
PH UR: 6 — SIGNIFICANT CHANGE UP (ref 5–8)
PLATELET # BLD AUTO: 220 K/UL — SIGNIFICANT CHANGE UP (ref 150–400)
POTASSIUM SERPL-MCNC: 3.6 MMOL/L — SIGNIFICANT CHANGE UP (ref 3.5–5.3)
POTASSIUM SERPL-SCNC: 3.6 MMOL/L — SIGNIFICANT CHANGE UP (ref 3.5–5.3)
PROT SERPL-MCNC: 6.9 G/DL — SIGNIFICANT CHANGE UP (ref 6–8.3)
PROT UR-MCNC: NEGATIVE — SIGNIFICANT CHANGE UP
RBC # BLD: 3.71 M/UL — LOW (ref 3.8–5.2)
RBC # FLD: 12.6 % — SIGNIFICANT CHANGE UP (ref 10.3–14.5)
SODIUM SERPL-SCNC: 142 MMOL/L — SIGNIFICANT CHANGE UP (ref 135–145)
SP GR SPEC: 1.01 — SIGNIFICANT CHANGE UP (ref 1.01–1.02)
TROPONIN I SERPL-MCNC: <0.015 NG/ML — SIGNIFICANT CHANGE UP (ref 0–0.04)
UROBILINOGEN FLD QL: NEGATIVE — SIGNIFICANT CHANGE UP
WBC # BLD: 6.62 K/UL — SIGNIFICANT CHANGE UP (ref 3.8–10.5)
WBC # FLD AUTO: 6.62 K/UL — SIGNIFICANT CHANGE UP (ref 3.8–10.5)

## 2021-09-21 PROCEDURE — 70450 CT HEAD/BRAIN W/O DYE: CPT | Mod: MA

## 2021-09-21 PROCEDURE — 70450 CT HEAD/BRAIN W/O DYE: CPT | Mod: 26,MA

## 2021-09-21 PROCEDURE — 83735 ASSAY OF MAGNESIUM: CPT

## 2021-09-21 PROCEDURE — 84484 ASSAY OF TROPONIN QUANT: CPT

## 2021-09-21 PROCEDURE — 99284 EMERGENCY DEPT VISIT MOD MDM: CPT | Mod: 25

## 2021-09-21 PROCEDURE — 72125 CT NECK SPINE W/O DYE: CPT | Mod: 26,MA

## 2021-09-21 PROCEDURE — 71045 X-RAY EXAM CHEST 1 VIEW: CPT

## 2021-09-21 PROCEDURE — 72125 CT NECK SPINE W/O DYE: CPT | Mod: MA

## 2021-09-21 PROCEDURE — 80053 COMPREHEN METABOLIC PANEL: CPT

## 2021-09-21 PROCEDURE — 96374 THER/PROPH/DIAG INJ IV PUSH: CPT

## 2021-09-21 PROCEDURE — 81001 URINALYSIS AUTO W/SCOPE: CPT

## 2021-09-21 PROCEDURE — 36415 COLL VENOUS BLD VENIPUNCTURE: CPT

## 2021-09-21 PROCEDURE — 85025 COMPLETE CBC W/AUTO DIFF WBC: CPT

## 2021-09-21 RX ORDER — KETOROLAC TROMETHAMINE 30 MG/ML
15 SYRINGE (ML) INJECTION ONCE
Refills: 0 | Status: DISCONTINUED | OUTPATIENT
Start: 2021-09-21 | End: 2021-09-21

## 2021-09-21 RX ADMIN — Medication 15 MILLIGRAM(S): at 02:19

## 2021-09-21 RX ADMIN — Medication 15 MILLIGRAM(S): at 02:22

## 2021-09-21 RX ADMIN — Medication 650 MILLIGRAM(S): at 02:18

## 2021-09-21 NOTE — ED ADULT NURSE NOTE - NSIMPLEMENTINTERV_GEN_ALL_ED
Implemented All Fall Risk Interventions:  [place] to call system. Call bell, personal items and telephone within reach. Instruct patient to call for assistance. Room bathroom lighting operational. Non-slip footwear when patient is off stretcher. Physically safe environment: no spills, clutter or unnecessary equipment. Stretcher in lowest position, wheels locked, appropriate side rails in place. Provide visual cue, wrist band, yellow gown, etc. Monitor gait and stability. Monitor for mental status changes and reorient to person, place, and time. Review medications for side effects contributing to fall risk. Reinforce activity limits and safety measures with patient and family.